# Patient Record
Sex: FEMALE | Race: WHITE
[De-identification: names, ages, dates, MRNs, and addresses within clinical notes are randomized per-mention and may not be internally consistent; named-entity substitution may affect disease eponyms.]

---

## 2018-08-08 ENCOUNTER — HOSPITAL ENCOUNTER (OUTPATIENT)
Dept: HOSPITAL 62 - LC | Age: 28
Discharge: HOME | End: 2018-08-08
Attending: OBSTETRICS & GYNECOLOGY
Payer: MEDICAID

## 2018-08-08 DIAGNOSIS — Z34.93: Primary | ICD-10-CM

## 2018-08-08 PROCEDURE — 4A1HXCZ MONITORING OF PRODUCTS OF CONCEPTION, CARDIAC RATE, EXTERNAL APPROACH: ICD-10-PCS | Performed by: OBSTETRICS & GYNECOLOGY

## 2018-09-28 ENCOUNTER — HOSPITAL ENCOUNTER (INPATIENT)
Dept: HOSPITAL 62 - LC | Age: 28
LOS: 2 days | Discharge: HOME | End: 2018-09-30
Attending: OBSTETRICS & GYNECOLOGY | Admitting: OBSTETRICS & GYNECOLOGY
Payer: MEDICAID

## 2018-09-28 DIAGNOSIS — A60.00: ICD-10-CM

## 2018-09-28 DIAGNOSIS — Z79.899: ICD-10-CM

## 2018-09-28 DIAGNOSIS — Z67.91: ICD-10-CM

## 2018-09-28 DIAGNOSIS — O26.893: ICD-10-CM

## 2018-09-28 DIAGNOSIS — Z3A.41: ICD-10-CM

## 2018-09-28 LAB
ADD MANUAL DIFF: NO
APPEARANCE UR: (no result)
APTT PPP: YELLOW S
BARBITURATES UR QL SCN: NEGATIVE
BASOPHILS # BLD AUTO: 0 10^3/UL (ref 0–0.2)
BASOPHILS NFR BLD AUTO: 0.3 % (ref 0–2)
BILIRUB UR QL STRIP: NEGATIVE
EOSINOPHIL # BLD AUTO: 0.1 10^3/UL (ref 0–0.6)
EOSINOPHIL NFR BLD AUTO: 1 % (ref 0–6)
ERYTHROCYTE [DISTWIDTH] IN BLOOD BY AUTOMATED COUNT: 14.1 % (ref 11.5–14)
GLUCOSE UR STRIP-MCNC: NEGATIVE MG/DL
HCT VFR BLD CALC: 35.5 % (ref 36–47)
HGB BLD-MCNC: 11.9 G/DL (ref 12–15.5)
KETONES UR STRIP-MCNC: NEGATIVE MG/DL
LYMPHOCYTES # BLD AUTO: 2.2 10^3/UL (ref 0.5–4.7)
LYMPHOCYTES NFR BLD AUTO: 15.8 % (ref 13–45)
MCH RBC QN AUTO: 30.8 PG (ref 27–33.4)
MCHC RBC AUTO-ENTMCNC: 33.5 G/DL (ref 32–36)
MCV RBC AUTO: 92 FL (ref 80–97)
METHADONE UR QL SCN: NEGATIVE
MONOCYTES # BLD AUTO: 0.8 10^3/UL (ref 0.1–1.4)
MONOCYTES NFR BLD AUTO: 5.7 % (ref 3–13)
NEUTROPHILS # BLD AUTO: 10.9 10^3/UL (ref 1.7–8.2)
NEUTS SEG NFR BLD AUTO: 77.2 % (ref 42–78)
NITRITE UR QL STRIP: NEGATIVE
PCP UR QL SCN: NEGATIVE
PH UR STRIP: 6 [PH] (ref 5–9)
PLATELET # BLD: 188 10^3/UL (ref 150–450)
PROT UR STRIP-MCNC: NEGATIVE MG/DL
RBC # BLD AUTO: 3.86 10^6/UL (ref 3.72–5.28)
SP GR UR STRIP: 1
TOTAL CELLS COUNTED % (AUTO): 100 %
URINE AMPHETAMINES SCREEN: NEGATIVE
URINE BENZODIAZEPINES SCREEN: NEGATIVE
URINE COCAINE SCREEN: NEGATIVE
URINE MARIJUANA (THC) SCREEN: NEGATIVE
UROBILINOGEN UR-MCNC: NEGATIVE MG/DL (ref ?–2)
WBC # BLD AUTO: 14.2 10^3/UL (ref 4–10.5)

## 2018-09-28 PROCEDURE — 36415 COLL VENOUS BLD VENIPUNCTURE: CPT

## 2018-09-28 PROCEDURE — 85461 HEMOGLOBIN FETAL: CPT

## 2018-09-28 PROCEDURE — 80307 DRUG TEST PRSMV CHEM ANLYZR: CPT

## 2018-09-28 PROCEDURE — 86900 BLOOD TYPING SEROLOGIC ABO: CPT

## 2018-09-28 PROCEDURE — 85025 COMPLETE CBC W/AUTO DIFF WBC: CPT

## 2018-09-28 PROCEDURE — 86901 BLOOD TYPING SEROLOGIC RH(D): CPT

## 2018-09-28 PROCEDURE — 86850 RBC ANTIBODY SCREEN: CPT

## 2018-09-28 PROCEDURE — 4A1HXCZ MONITORING OF PRODUCTS OF CONCEPTION, CARDIAC RATE, EXTERNAL APPROACH: ICD-10-PCS | Performed by: OBSTETRICS & GYNECOLOGY

## 2018-09-28 PROCEDURE — 86592 SYPHILIS TEST NON-TREP QUAL: CPT

## 2018-09-28 PROCEDURE — 84112 EVAL AMNIOTIC FLUID PROTEIN: CPT

## 2018-09-28 PROCEDURE — 59025 FETAL NON-STRESS TEST: CPT

## 2018-09-28 PROCEDURE — 81005 URINALYSIS: CPT

## 2018-09-28 PROCEDURE — 85027 COMPLETE CBC AUTOMATED: CPT

## 2018-09-28 RX ADMIN — Medication PRN MLS/HR: at 21:19

## 2018-09-28 RX ADMIN — PENICILLIN G POTASSIUM SCH MLS/HR: 5000000 POWDER, FOR SOLUTION INTRAMUSCULAR; INTRAPLEURAL; INTRATHECAL; INTRAVENOUS at 20:51

## 2018-09-28 RX ADMIN — PENICILLIN G POTASSIUM SCH MLS/HR: 5000000 POWDER, FOR SOLUTION INTRAMUSCULAR; INTRAPLEURAL; INTRATHECAL; INTRAVENOUS at 16:28

## 2018-09-28 NOTE — ADMISSION PHYSICAL
=================================================================



=================================================================

Datetime Report Generated by CPN: 2018 13:00

   

   

=================================================================

CURRENT ADMISSION

=================================================================

   

Prenatal Hx Assessment:  The Prenatal History has been Reviewed and is

   Current

Chief Complaint:  Uterine Contractions; Suspected Ruptured Membranes

Indication for Induction:  Not Applicable

Admit Impression :  Term, Intrauterine Pregnancy; Active Labor;

   Ruptured Membranes

Admit Plan:  Admit to Unit; Initiate Labor Protocol

   

=================================================================

ALLERGIES

=================================================================

   

Medication Allergies:  No

Medication Allergies:  No Known Allergies (2018)

Latex:  No Latex Allergies

   

=================================================================

OBSTETRICAL HISTORY

=================================================================

   

EDC:  2018 00:00

:  2

Para:  0

Gestational Diabetes:  No

Rh Sensitization:  No

Incompetent Cervix:  No

XIAO:  No

Infertility:  No

ART Treatment:  No

Uterine Anomaly:  No

IUGR:  No

Hx Previous C/S:  No

Macrosomia:  No

Hx Loss/Stillborn:  No

PIH:  No

Hx  Death:  No

Placenta Previa/Abruption:  No

Depression/PP Depression:  No

PTL/PROM:  No

Post Partum Hemorrhage:  No

Current Pregnancy Procedures:  Ultrasound; NST

Obstetrical History Comments:  G1: EA2010

   G2: current

   

=================================================================

***SEE PRENATAL RECORDS***

=================================================================

   

Alcohol:  No

Marijuana :  No

Cocaine:  No

Other Illicit Drugs:  No

Cigarettes:  Current Some Day Smoker. [id number]

   

=================================================================

MEDICAL HISTORY

=================================================================

   

Diabetes:  No

Blood Transfusion:  No

Pulmonary Disease (Asthma, TB):  No

Breast Disease:  No

Hypertension:  No

Gyn Surgery:  No

Heart Disease:  No

Hosp/Surgery:  No

Autoimmune Disorder:  No

Anesthetic Complications:  No

Kidney Disease:  No

Abnormal Pap Smear:  No

Neuro/Epilepsy:  No

Psychiatric Disorders:  No

Other Medical Diseases:  No

Hepatitis/Liver Disease:  No

Significant Family History:  No

Varicosities/Phlebitis:  No

Trauma/Violence :  No

Thyroid Dysfunction:  No

   

=================================================================

INFECTIOUS HISTORY

=================================================================

   

Gonorrhea:  No

Genital Herpes:  Yes

Chlamydia:  No

Tuberculosis:  No

Syphilis:  No

Hepatitis:  No

HIV/AIDS Exposure:  No

Rash or Viral Illness:  No

HPV:  No

Infectious History Comments:  HSV

   

=================================================================

PHYSICAL EXAM

=================================================================

   

General:  Normal

HEENT:  Deferred

Neurologic:  Normal

Thyroid:  Normal

Heart:  Normal

Lungs:  Normal

Breast:  Deferred

Back:  Normal

Abdomen:  Normal

Genitourinary Exam:  Normal

Extremities:  Normal

DTRs:  Normal

Pelvic Type:  Adequate

Physical Exam Comments:  + Quad screen for DS

   RH neg

   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

   + GBS

   Followed with NST/[facility]

Vital Signs:  Reviewed

   

=================================================================

VAGINAL EXAM

=================================================================

   

Dilatation:  2

Effacement:  90

Station:  0

   

=================================================================

MEMBRANES

=================================================================

   

Membranes:  Ruptured

   

=================================================================

FETUS A

=================================================================

   

EGA:  41.3

Monitoring:  External US

FHR- Baseline:  130

Variability:  Moderate 6-25bpm

Accelerations:  15X15

Decelerations:  None

Fetal Presentation:  Vertex

Admit Comment:  Admitted to LD with ROM, irreg uc's, clear fluid

   no pain, no bleeding, does not desire epidural, would like to do it

   by herself 

   No lesions, VE /vtx/0

   Abd soft without uc's

   Dr. Peña aware of admission

   IV antibiotics for + GBS

   

=================================================================

PLANS FOR LABOR AND DELIVERY

=================================================================

   

Pain Management:  None

Feeding Preference:  Breast

Benefit of Breast Feed Discussed:  Yes

Circumcision:  N/A

   

=================================================================

INFORMED CONSENT

=================================================================

   

Assignment:  Yasir Peña MD

Signature:  Electronically signed by Bhavani Small CNM on 2018 at

   13:00  with User ID: [email or web address]

:  Electronically signed by Bhavani Small CNM on 2018 at 13:00  with

   User ID: [email or web address]

## 2018-09-29 LAB
ERYTHROCYTE [DISTWIDTH] IN BLOOD BY AUTOMATED COUNT: 13.7 % (ref 11.5–14)
HCT VFR BLD CALC: 27.1 % (ref 36–47)
HGB BLD-MCNC: 9.4 G/DL (ref 12–15.5)
MCH RBC QN AUTO: 31.4 PG (ref 27–33.4)
MCHC RBC AUTO-ENTMCNC: 34.6 G/DL (ref 32–36)
MCV RBC AUTO: 91 FL (ref 80–97)
PLATELET # BLD: 186 10^3/UL (ref 150–450)
RBC # BLD AUTO: 2.99 10^6/UL (ref 3.72–5.28)
WBC # BLD AUTO: 22 10^3/UL (ref 4–10.5)

## 2018-09-29 RX ADMIN — Medication SCH: at 09:54

## 2018-09-29 RX ADMIN — IBUPROFEN SCH MG: 800 TABLET, FILM COATED ORAL at 21:00

## 2018-09-29 RX ADMIN — SENNOSIDES, DOCUSATE SODIUM SCH EACH: 50; 8.6 TABLET, FILM COATED ORAL at 09:54

## 2018-09-29 RX ADMIN — DOCUSATE SODIUM SCH MG: 100 CAPSULE, LIQUID FILLED ORAL at 09:54

## 2018-09-29 RX ADMIN — Medication PRN MLS/HR: at 00:59

## 2018-09-29 RX ADMIN — FERROUS SULFATE TAB 325 MG (65 MG ELEMENTAL FE) SCH MG: 325 (65 FE) TAB at 09:54

## 2018-09-29 RX ADMIN — FERROUS SULFATE TAB 325 MG (65 MG ELEMENTAL FE) SCH MG: 325 (65 FE) TAB at 17:09

## 2018-09-29 RX ADMIN — IBUPROFEN SCH MG: 800 TABLET, FILM COATED ORAL at 13:08

## 2018-09-29 RX ADMIN — FAMOTIDINE SCH: 20 TABLET, FILM COATED ORAL at 20:59

## 2018-09-29 RX ADMIN — FAMOTIDINE SCH MG: 20 TABLET, FILM COATED ORAL at 09:54

## 2018-09-29 RX ADMIN — DOCUSATE SODIUM SCH: 100 CAPSULE, LIQUID FILLED ORAL at 17:09

## 2018-09-29 RX ADMIN — IBUPROFEN SCH: 800 TABLET, FILM COATED ORAL at 06:08

## 2018-09-29 NOTE — PDOC PROGRESS REPORT
Subjective-OB


Progress Note for:: 09/29/18


Subjective: 





Doing well, no c/o, feels good, bleeding improved, no lesions





Physical Exam (OB)


Vital Signs: 


 











Temp Pulse Resp BP Pulse Ox


 


 99.9 F   71   18   114/61   100 


 


 09/29/18 02:23  09/29/18 02:23  09/29/18 02:23  09/29/18 02:23  09/29/18 02:23








 Intake & Output











 09/28/18 09/29/18 09/30/18





 06:59 06:59 06:59


 


Intake Total  72 


 


Balance  72 


 


Weight  72.1 kg 














- PIH/Pre-Eclampsia


DTR's: 2 +


Clonus: Negative


Headache: Absent


Epigastric Pain: No


Visual Changes: No





- Lochia


Lochia Amount: Small 10-25 ml


Lochia Color: Rubra/Red





- Abdomen


Description: Tender, Soft


Hernia Present: No


Fundal Description: Firm, Midline


Fundal Height: u/u - u/2





Objective-Diagnostic


Laboratory: 


 





 09/29/18 07:59 





 











  09/28/18 09/28/18 09/28/18





  11:55 13:10 13:10


 


WBC   14.2 H 


 


RBC   3.86 


 


Hgb   11.9 L 


 


Hct   35.5 L 


 


MCV   92 


 


MCH   30.8 


 


MCHC   33.5 


 


RDW   14.1 H 


 


Plt Count   188 


 


Seg Neutrophils %   77.2 


 


Lymphocytes %   15.8 


 


Monocytes %   5.7 


 


Eosinophils %   1.0 


 


Basophils %   0.3 


 


Absolute Neutrophils   10.9 H 


 


Absolute Lymphocytes   2.2 


 


Absolute Monocytes   0.8 


 


Absolute Eosinophils   0.1 


 


Absolute Basophils   0.0 


 


Urine Color  YELLOW  


 


Urine Appearance  SLIGHTLY-CLOUDY  


 


Urine pH  6.0  


 


Ur Specific Gravity  1.004  


 


Urine Protein  NEGATIVE  


 


Urine Glucose (UA)  NEGATIVE  


 


Urine Ketones  NEGATIVE  


 


Urine Blood  NEGATIVE  


 


Urine Nitrite  NEGATIVE  


 


Ur Leukocyte Esterase  SMALL H  


 


Blood Type    A NEGATIVE


 


Antibody Screen    NEGATIVE














  09/29/18





  07:59


 


WBC  22.0 H


 


RBC  2.99 L


 


Hgb  9.4 L D


 


Hct  27.1 L


 


MCV  91


 


MCH  31.4


 


MCHC  34.6


 


RDW  13.7


 


Plt Count  186


 


Seg Neutrophils % 


 


Lymphocytes % 


 


Monocytes % 


 


Eosinophils % 


 


Basophils % 


 


Absolute Neutrophils 


 


Absolute Lymphocytes 


 


Absolute Monocytes 


 


Absolute Eosinophils 


 


Absolute Basophils 


 


Urine Color 


 


Urine Appearance 


 


Urine pH 


 


Ur Specific Gravity 


 


Urine Protein 


 


Urine Glucose (UA) 


 


Urine Ketones 


 


Urine Blood 


 


Urine Nitrite 


 


Ur Leukocyte Esterase 


 


Blood Type 


 


Antibody Screen 














Assessment and Plan(PN)





- Assessment and Plan


(1) Genital HSV


Qualifiers: 


   Herpes simplex infection site: unspecified   Qualified Code(s): A60.00 - 

Herpesviral infection of urogenital system, unspecified   


Is this a current diagnosis for this admission?: Yes   





(2) Smoker


Is this a current diagnosis for this admission?: Yes   





(3) Delivery normal


Is this a current diagnosis for this admission?: Yes   





- Time Spent with Patient


Time with patient: Less than 15 minutes


Medications reviewed and adjusted accordingly: Yes





- Disposition


Anticipated Discharge: Home


Within: within 24 hours

## 2018-09-29 NOTE — WARNING SIGNS IN BABIES
=================================================================

VOD Warning Signs

=================================================================

Datetime Report Generated by [facility]: 09/29/2018 00:43

   

VOD#608 -Warning Signs in Babies:  Needs to be viewed.    (08/08/2018

   14:52:Nicole Herrera RN)

## 2018-09-29 NOTE — DELIVERY SUMMARY
=================================================================

Del Sum A-C

=================================================================

Datetime Report Generated by CPN: 2018 02:29

   

   

=================================================================

DELIVERY PERSONNEL

=================================================================

   

DELIVERY PERSONNEL:  [id number]

Delivery Doctor::  Yasir Peña MD

Labor and Delivery Nurse::  Kenyetta Villalba RN

Labor and Delivery Nurse::  Nicole Herrera RN

Scrub Tech/CNA:  [place], ST

   

=================================================================

MATERNAL INFORMATION

=================================================================

   

Delivery Anesthesia:  None

Medications After Delivery:  Pitocin Drip 20 Units/1000ml NSS;

   Methergine 0.2mg IM; Cytotec 1000mcg Per Rectum/Vagina

Meds After Delivery Comment:  Second bag of Pitocin administered

Maternal Complications:  None

   

=================================================================

LABOR SUMMARY

=================================================================

   

EDC:  2018 00:00

No. Babies in Womb:  1

 Attempted:  No

Labor Anesthesia:  None

   

=================================================================

LABOR INFORMATION

=================================================================

   

Reason for Induction:  Not Applicable

Onset of Labor:  2018 21:20

Complete Dilatation:  2018 22:57

Oxytocin:  Augmentation

Group B Beta Strep:  Positive

Antibiotics # of Doses:  2

Antibiotics Time of Last Dose:  

Name of Antibiotic Given:  Penicillin

Steroids Given:  None

Reason Steroids Not Administered:  Not Applicable

   

=================================================================

MEMBRANES

=================================================================

   

Membranes Rupture Method:  Spontaneous

Rupture of Membranes:  2018 09:00

Length of Rupture (hr):  14.57

Amniotic Fluid Color:  Clear

Amniotic Fluid Amount:  None

Amniotic Fluid Odor:  Normal

   

=================================================================

STAGES OF LABOR

=================================================================

   

Stage 1 hr:  1

Stage 1 min:  37

Stage 2 hr:  0

Stage 2 min:  37

Stage 3 hr:  24

Stage 3 min:  3

Total Time in Labor hr:  26

Total Time in Labor min:  17

   

=================================================================

VAGINAL DELIVERY

=================================================================

   

Episiotomy:  None

Laceration #1:  None

Laceration Extension #1:  N/A

Other Laceration:  Labial

Laceration Repair:  Yes

Laceration Repair Note:  left upper labia repaired with 2-0 vicryl

Sponge Count Correct:  Yes

Sharps Count Correct:  Yes

   

=================================================================

CSECTION DELIVERY

=================================================================

   

Primary Indication:  N/A

Secondary Indication:  N/A

CSection Incidence:  N/A

Labor:  N/A

Elective:  N/A

CSection Incision:  N/A

   

=================================================================

BABY A INFORMATION

=================================================================

   

Infant Delivery Date/Time:  2018 23:34

Method of Delivery:  Vaginal (Annotations: Data stored by [facility] on behalf

   of user)

Born in Route :  No

:  N/A

Forceps:  N/A

Vacuum Extraction:  N/A

Shoulder Dystocia :  No

   

=================================================================

PRESENTATION/POSITION BABY A

=================================================================

   

Presentation:  Cephalic

Cephalic Presentation:  Vertex

Vertex Position:  Left Occipital Anterior

Breech Presentation:  N/A

   

=================================================================

PLACENTA INFORMATION BABY A

=================================================================

   

Placenta Delivery Time :  2018 23:37

Placenta Method of Delivery:  Spontaneous

Placenta Status:  Delivered

   

=================================================================

APGAR SCORES BABY A

=================================================================

   

Heart Rate 1 min:  >100 bpm

Resp Effort 1 min:  Good Cry

Reflex Irritability 1 min:  Cough or Sneeze or Pulls Away

Muscle Tone 1 min:  Active Motion

Color 1 min:  Blue/Pale

Resuscitation Effort 1 min:  N/A

APGAR SCORE 1 MIN:  8

Heart Rate 5 min:  >100 bpm

Resp Effort 5 min:  Good Cry

Reflex Irritability 5 min:  Cough or Sneeze or Pulls Away

Muscle Tone 5 min:  Active Motion

Color 5 min:  Body Pink, Extremities Blue

Resuscitation Effort 5 min:  N/A

APGAR SCORE 5 MIN:  9

   

=================================================================

INFANT INFORMATION BABY A

=================================================================

   

Gestational Age at Delivery:  41.3

Gestational Status:  Late Term-  41- 41.6 Weeks

Infant Outcome :  Liveborn

Infant Condition :  Stable

Infant Sex:  Female (Annotations: Data stored by [facility] on behalf of user)

   

=================================================================

IDENTIFICATION BABY A

=================================================================

   

Infant Verification Date/Time:  2018 23:42

ID Band Number:  [id number]

Mother's Name Verified:  Yes

Infant Medical Record Number:  [id number]

RN Verifying Infant:  PARRIS LeblancBogdan MONTIEL

Additional Verifying Personnel:  Flu[employer]cy[employer]es[employer]ARSALAN RN

   

=================================================================

WEIGHT/LENGTH BABY A

=================================================================

   

Infant Birthweight (gm):  3490

Infant Weight (lb):  7

Infant Weight (oz):  11

Infant Length (in):  19.50

Infant Length (cm):  49.53

   

=================================================================

CORD INFORMATION BABY A

=================================================================

   

No. Cord Vessels:  3

Nuchal Cord :  Around Neck x1, Loose

Cord Blood Taken:  Yes-For Eval (Mom's Blood Type - or O+)

Infant Suction:  Mouth

   

=================================================================

ASSESSMENT BABY A

=================================================================

   

Infant Complications:  None

Physical Findings at Delivery:  Within Normal Limits

Physical Findings- Other:  terminal meconium

Infant Respirations:  Appears Normal

Skin to Skin:  Yes

Neonatologist/ALS Called :  No

Infant Care By:  RN Kossmann

Transferred To:  Remains with Mother

   

=================================================================

BABY B INFORMATION

=================================================================

   

 :  N/A

   

=================================================================

SIGNATURES

=================================================================

   

Signature:  Electronically signed by Yasir Peña MD ([place]) on

   2018 at 23:50  with User ID: [email or web address]

## 2018-09-30 VITALS — SYSTOLIC BLOOD PRESSURE: 112 MMHG | DIASTOLIC BLOOD PRESSURE: 67 MMHG

## 2018-09-30 LAB
ERYTHROCYTE [DISTWIDTH] IN BLOOD BY AUTOMATED COUNT: 14.4 % (ref 11.5–14)
HCT VFR BLD CALC: 26.5 % (ref 36–47)
HGB BLD-MCNC: 8.9 G/DL (ref 12–15.5)
MCH RBC QN AUTO: 31.2 PG (ref 27–33.4)
MCHC RBC AUTO-ENTMCNC: 33.5 G/DL (ref 32–36)
MCV RBC AUTO: 93 FL (ref 80–97)
PLATELET # BLD: 150 10^3/UL (ref 150–450)
RBC # BLD AUTO: 2.84 10^6/UL (ref 3.72–5.28)
WBC # BLD AUTO: 13.6 10^3/UL (ref 4–10.5)

## 2018-09-30 RX ADMIN — FERROUS SULFATE TAB 325 MG (65 MG ELEMENTAL FE) SCH MG: 325 (65 FE) TAB at 09:28

## 2018-09-30 RX ADMIN — SENNOSIDES, DOCUSATE SODIUM SCH: 50; 8.6 TABLET, FILM COATED ORAL at 09:30

## 2018-09-30 RX ADMIN — DOCUSATE SODIUM SCH: 100 CAPSULE, LIQUID FILLED ORAL at 09:30

## 2018-09-30 RX ADMIN — FERROUS SULFATE TAB 325 MG (65 MG ELEMENTAL FE) SCH: 325 (65 FE) TAB at 17:36

## 2018-09-30 RX ADMIN — IBUPROFEN SCH MG: 800 TABLET, FILM COATED ORAL at 06:11

## 2018-09-30 RX ADMIN — DOCUSATE SODIUM SCH: 100 CAPSULE, LIQUID FILLED ORAL at 17:35

## 2018-09-30 RX ADMIN — IBUPROFEN SCH MG: 800 TABLET, FILM COATED ORAL at 13:35

## 2018-09-30 RX ADMIN — Medication SCH: at 09:29

## 2018-09-30 RX ADMIN — FAMOTIDINE SCH: 20 TABLET, FILM COATED ORAL at 09:30

## 2018-09-30 NOTE — PDOC PROGRESS REPORT
Subjective-OB


Progress Note for:: 09/30/18


Subjective: 


No c/o, ready to go home, had Rhogam





Physical Exam (OB)


Vital Signs: 


 











Temp Pulse Resp BP Pulse Ox


 


 98.5 F   81   16   112/67   97 


 


 09/30/18 08:52  09/30/18 08:52  09/30/18 08:52  09/30/18 08:52  09/30/18 08:52








 Intake & Output











 09/29/18 09/30/18 10/01/18





 06:59 06:59 06:59


 


Intake Total 72 300 


 


Output Total   60


 


Balance 72 300 -60


 


Weight 72.1 kg  














- PIH/Pre-Eclampsia


DTR's: 2 +


Clonus: Negative


Headache: Absent


Epigastric Pain: No


Visual Changes: No





- Lochia


Lochia Amount: Small 10-25 ml


Lochia Color: Rubra/Red





- Abdomen


Description: Soft


Hernia Present: No


Fundal Description: Firm, Midline


Fundal Height: u/u - u/2





Objective-Diagnostic


Laboratory: 


 





 09/30/18 08:40 





 











  09/29/18 09/30/18





  07:59 08:40


 


WBC   13.6 H


 


RBC   2.84 L


 


Hgb   8.9 L


 


Hct   26.5 L


 


MCV   93


 


MCH   31.2


 


MCHC   33.5


 


RDW   14.4 H


 


Plt Count   150


 


Blood Type  A NEGATIVE 














Assessment and Plan(PN)





- Assessment and Plan


(1) Genital HSV


Qualifiers: 


   Herpes simplex infection site: unspecified   Qualified Code(s): A60.00 - 

Herpesviral infection of urogenital system, unspecified   


Is this a current diagnosis for this admission?: Yes   





(2) Smoker


Is this a current diagnosis for this admission?: Yes   





(3) Delivery normal


Is this a current diagnosis for this admission?: Yes   





- Time Spent with Patient


Medications reviewed and adjusted accordingly: Yes





- Disposition


Anticipated Discharge: Home


Within: Other - home today

## 2018-09-30 NOTE — PDOC DISCHARGE SUMMARY
Final Diagnosis


Discharge Date: 18





- Final Diagnosis


(1) Genital HSV


Is this a current diagnosis for this admission?: Yes   





(2) Smoker


Is this a current diagnosis for this admission?: Yes   





(3) Delivery normal


Is this a current diagnosis for this admission?: Yes   





Discharge Data





- Discharge Medication


Home Medications: 








Prenatal Vit Calc,Iron,Folic [Prenatal Vitamins] 1 tab PO DAILY 18 


Valacyclovir HCl [Valtrex] 1 tab PO DAILY 18 








Gestational Age: 41.3


Reason(s) for Admission: PROM, Group B Strep Positive - augmented with Pitocin


Prenatal Procedures: NST, Ultrasound


Intrapartum Procedure(s): Spontaneous Vaginal Delivery


Postpartum Complication(s): Laceration-Labial





-  Data


  ** Baby 1 Female


Home with Mother: Yes


Complications: No





- Diagnosis Test


Laboratory: 


 











Temp Pulse Resp BP Pulse Ox


 


 98.5 F   81   16   112/67   97 


 


 18 08:52  18 08:52  18 08:52  18 08:52  18 08:52








 











  18





  11:55 13:10 07:59


 


RBC   3.86  2.99 L


 


Hgb   11.9 L  9.4 L D


 


Hct   35.5 L  27.1 L


 


Urine Opiates Screen  NEGATIVE  














  18





  08:40


 


RBC  2.84 L


 


Hgb  8.9 L


 


Hct  26.5 L


 


Urine Opiates Screen 














- Discharge information/Instructions


Discharge Activity: Activity As Tolerated, Balance Activity w/Rest, No tub bath


Discharge Diet: As Tolerated, Regular


Disposition: HOME, SELF-CARE


Follow up with: [facility]


in: 3, Weeks

## 2019-11-25 ENCOUNTER — HOSPITAL ENCOUNTER (OUTPATIENT)
Dept: HOSPITAL 62 - OD | Age: 29
End: 2019-11-25
Attending: INTERNAL MEDICINE
Payer: MEDICAID

## 2019-11-25 DIAGNOSIS — M25.559: Primary | ICD-10-CM

## 2019-11-25 PROCEDURE — 73522 X-RAY EXAM HIPS BI 3-4 VIEWS: CPT

## 2019-11-25 NOTE — RADIOLOGY REPORT (SQ)
EXAM DESCRIPTION:  HIPS BILATERAL



COMPLETED DATE/TIME:  11/25/2019 9:46 am



REASON FOR STUDY:  PAIN IN UNSPECIFIED HIP M25.559  PAIN IN UNSPECIFIED HIP



COMPARISON:  None.



NUMBER OF VIEWS:  Two views



TECHNIQUE:  AP pelvis and additional frog-leg view of both hips.



LIMITATIONS:  None.



FINDINGS:  MINERALIZATION: Normal.

HIPS: No acute fracture or dislocation. No worrisome bone lesions.

PELVIS AND SACRUM:  No acute fracture or dislocation. No worrisome bone lesions.

PUBIS AND ISCHIUM: No acute fracture.

LOWER LUMBAR SPINE: No significant findings as visualized.

SOFT TISSUES: No findings.

OTHER: No other significant finding.



IMPRESSION:  NEGATIVE STUDY OF THE PELVIS AND HIPS.



TECHNICAL DOCUMENTATION:  JOB ID:  [id number]

 2011 [employer]- All Rights Reserved



Reading location - IP/workstation name: [email or web address]